# Patient Record
Sex: FEMALE | HISPANIC OR LATINO | URBAN - METROPOLITAN AREA
[De-identification: names, ages, dates, MRNs, and addresses within clinical notes are randomized per-mention and may not be internally consistent; named-entity substitution may affect disease eponyms.]

---

## 2021-12-06 ENCOUNTER — IMPORTED ENCOUNTER (OUTPATIENT)
Dept: URBAN - METROPOLITAN AREA CLINIC 31 | Facility: CLINIC | Age: 36
End: 2021-12-06

## 2021-12-06 PROBLEM — H04.123: Noted: 2021-12-06

## 2021-12-06 PROBLEM — G43.B0: Noted: 2021-12-06

## 2021-12-06 PROCEDURE — 92310 CONTACT LENS FITTING OU: CPT

## 2021-12-06 PROCEDURE — 92015 DETERMINE REFRACTIVE STATE: CPT

## 2021-12-06 PROCEDURE — 92004 COMPRE OPH EXAM NEW PT 1/>: CPT

## 2021-12-06 NOTE — PATIENT DISCUSSION
1.  Opthalmic Migraine - Headaches--  Been more in past yr. Pt has seen 4 ENt and had Balloon sx but no help. Had Cat scan and nothing. Discussed opthalmic migraine auras and possibilty of avoiding triggers. Continue to follow with primary medical provider. 2. Dry Eyes OU:  Start artificials tears. Encouraged regular use. 3. Myope--- Disp trials of lower power rx. Was wearing -3.50. Gave trials of 2 dailies Precision and My day 10 lenses each of -3.25. Wears Blue light lenses when on screens. 4. Need to get gls--Has none currently5.   RTN 1 yr CE    [address] [facility]

## 2022-04-02 ASSESSMENT — VISUAL ACUITY
OD_SC: 20/30
OS_CC: CF@2'
OD_CC: CF@2'
OS_SC: 20/30

## 2022-04-02 ASSESSMENT — TONOMETRY
OD_IOP_MMHG: 12
OS_IOP_MMHG: 14